# Patient Record
Sex: FEMALE | HISPANIC OR LATINO | Employment: UNEMPLOYED | URBAN - METROPOLITAN AREA
[De-identification: names, ages, dates, MRNs, and addresses within clinical notes are randomized per-mention and may not be internally consistent; named-entity substitution may affect disease eponyms.]

---

## 2023-10-26 ENCOUNTER — HOSPITAL ENCOUNTER (EMERGENCY)
Facility: CLINIC | Age: 23
Discharge: HOME | End: 2023-10-26
Attending: EMERGENCY MEDICINE | Admitting: EMERGENCY MEDICINE

## 2023-10-26 VITALS
DIASTOLIC BLOOD PRESSURE: 71 MMHG | OXYGEN SATURATION: 99 % | TEMPERATURE: 97.9 F | SYSTOLIC BLOOD PRESSURE: 117 MMHG | RESPIRATION RATE: 20 BRPM | HEART RATE: 103 BPM

## 2023-10-26 DIAGNOSIS — T80.29XA INFECTION OF INJECTION SITE, INITIAL ENCOUNTER: ICD-10-CM

## 2023-10-26 PROCEDURE — 99283 EMERGENCY DEPT VISIT LOW MDM: CPT | Performed by: EMERGENCY MEDICINE

## 2023-10-26 RX ORDER — DOXYCYCLINE 100 MG/1
100 CAPSULE ORAL 2 TIMES DAILY
Qty: 14 CAPSULE | Refills: 0 | Status: SHIPPED | OUTPATIENT
Start: 2023-10-26 | End: 2023-11-02

## 2023-10-26 NOTE — ED TRIAGE NOTES
Pt arrives ambulatory to triage c/o R arm numbness and tingling.  Hx of meth use.  Uses the same vein on the R arm and shows signs of a possible infection.       Triage Assessment (Adult)       Row Name 10/26/23 0943          Triage Assessment    Airway WDL WDL        Respiratory WDL    Respiratory WDL WDL        Skin Circulation/Temperature WDL    Skin Circulation/Temperature WDL WDL        Cardiac WDL    Cardiac WDL WDL        Peripheral/Neurovascular WDL    Peripheral Neurovascular WDL WDL        Cognitive/Neuro/Behavioral WDL    Cognitive/Neuro/Behavioral WDL WDL

## 2023-10-26 NOTE — PROGRESS NOTES
Brief Social Work Progress Note    Information Obtained: Writer received page from Pharmacy RE: Pt being unable to pay for her discharge medications.     Spoke with pharmacist/pharm tech. Pt will be discharged on doxycycline; cost to fill is $20.24. Chart reviewed. Pt has an ongoing IV meth use and reports she does not plan to stop at this time. Pt's infection that the medications will be treating are as a result of pt's IV drug use per MD. Per MD, infection has been caught early, and before it has gotten worse, will agree to provide one time coverage for discharge Abx at this time.     Follow-Up Plan: None         ________________    RUTH Camejo, [facility]  ED/Observation   [facility]  Phone: [phone]  Pager: [phone]  Fax: [phone]    On-call pager, [phone], 4:00pm to midnight

## 2023-10-26 NOTE — DISCHARGE INSTRUCTIONS
Thank you for coming to the [facility] Emergency Department.     Please try to stop using IV drugs. Do not continue to inject into the right arm.   Apply warm packs to the sore area and take all the antibiotics until they are gone.     Return to the ER immediately if you get:  Arm redness or worsening swelling  Fever >100.4F    For assistance with getting clean or stopping IV use, please follow up in the:    [facility]  [facility] [address], [address]   [place], [place], [address]  Phone: [phone]  Fax: [phone]    Open Monday-Friday  Closed over lunch hour  Walk in hours: 9am-11:30am and 12:30-3pm    *For [facility] providers, if you'd like to have Recovery Clinic staff reach out to a patient, enter ambulatory order Adult Mental Health  Referral #[id number] for Addiction Medicine or [facility] Mental Health Referral #[phone] for Addiction Medicine, as appropriate.

## 2023-10-26 NOTE — ED PROVIDER NOTES
ED Provider Note  [facility]      History     Chief Complaint   Patient presents with    Arm Pain     HPI  Myra Tian is a 23-year-old female who she has a history of IV meth abuse. She regularly injects in to the right antecubital fossa and continues to use the site despite pain. Now has pain in a band on the radial side of the right forearm. No skin rash or erythema. She does not have fevers or chills. She does feel like her arm intermittently tingles but she has sensation in the hand and normal arm motor function.  She is not interested in stopping use.    Past Medical History  No past medical history on file.  No past surgical history on file.  doxycycline hyclate (VIBRAMYCIN) 100 MG capsule      No Known Allergies  Family History  No family history on file.  Social History          A medically appropriate review of systems was performed with pertinent positives and negatives noted in the HPI, and all other systems negative.    Physical Exam   BP: 117/71  Pulse: 103  Temp: 97.9  F (36.6  C)  Resp: 20  SpO2: 99 %  Physical Exam  Gen:A&Ox3, no acute distress  CV:RRR without murmurs  PULM:Clear to auscultation bilaterally  UE: + radial pulse and normal hand perfusion. Capillary refill 1 second. Right antecubital fossa with injection marks. Has firm, non-fluctuant area on the medial side of the fossa.   LE:no traumatic injuries, skin normal  Neuro:CN II-XII intact, strength 5/5 throughout the right arm, sensation intact throughout the right arm  Skin: no rashes or ecchymoses      ED Course, Procedures, & Data      Procedures         No results found for any visits on 10/26/23.  Medications - No data to display  Labs Ordered and Resulted from Time of ED Arrival to Time of ED Departure - No data to display  No orders to display          Critical care was not performed.     Medical Decision Making  The patient's presentation was of moderate complexity (an acute complicated  injury).    The patient's evaluation involved:  history and exam without other MDM data elements    The patient's management necessitated moderate risk (prescription drug management including medications given in the ED).    Assessment & Plan     23-year-old female presenting with right arm pain in the setting of IV drug injection.  On her physical exam she has an area of scarred nodularity that appears to be a healed prior skin soft tissue infection. She does not have current fluctuance, erythema or draining wounds.  Her hand is neurovascularly intact.  I do not have suspicion today for acute necrotizing fasciitis or severe significant neurovascular injury.  We will have her apply warm packs to the sore area. I did start her on some antibiotics for the possibility of a deep abscess that I cannot appreciate right now. Instructed to return if a fluctuant abscess develops. I referred her to follow-up in the recovery clinic for assistance if she changes her mind about interest in getting clean. Reviewed return instructions in the event of signs of developing skin soft tissue infection that is more significant.  Discharged.    I have reviewed the nursing notes. I have reviewed the findings, diagnosis, plan and need for follow up with the patient.    New Prescriptions    DOXYCYCLINE HYCLATE (VIBRAMYCIN) 100 MG CAPSULE    Take 1 capsule (100 mg) by mouth 2 times daily for 7 days       Final diagnoses:   Infection of injection site, initial encounter     This part of the medical record was transcribed by Faby Wolfe, Medical Scribe, from a dictation done by Mely Holloway MD.     Mely Holloway MD  [facility] EMERGENCY DEPARTMENT  10/26/2023     Mely Holloway MD  10/27/23 1000

## 2024-05-02 ENCOUNTER — APPOINTMENT (OUTPATIENT)
Dept: GENERAL RADIOLOGY | Facility: CLINIC | Age: 24
End: 2024-05-02
Attending: EMERGENCY MEDICINE
Payer: MEDICAID

## 2024-05-02 ENCOUNTER — HOSPITAL ENCOUNTER (OUTPATIENT)
Facility: CLINIC | Age: 24
Setting detail: OBSERVATION
Discharge: HOME | End: 2024-05-03
Attending: EMERGENCY MEDICINE | Admitting: EMERGENCY MEDICINE
Payer: MEDICAID

## 2024-05-02 ENCOUNTER — APPOINTMENT (OUTPATIENT)
Dept: ULTRASOUND IMAGING | Facility: CLINIC | Age: 24
End: 2024-05-02
Attending: EMERGENCY MEDICINE
Payer: MEDICAID

## 2024-05-02 DIAGNOSIS — L03.113 CELLULITIS OF RIGHT HAND: ICD-10-CM

## 2024-05-02 DIAGNOSIS — F19.10 POLYSUBSTANCE ABUSE (H): ICD-10-CM

## 2024-05-02 DIAGNOSIS — L02.91 PHLEGMON: ICD-10-CM

## 2024-05-02 LAB
ALBUMIN SERPL BCG-MCNC: 4.2 G/DL (ref 3.5–5.2)
ALP SERPL-CCNC: 79 U/L (ref 40–150)
ALT SERPL W P-5'-P-CCNC: 25 U/L (ref 0–50)
ANION GAP SERPL CALCULATED.3IONS-SCNC: 8 MMOL/L (ref 7–15)
AST SERPL W P-5'-P-CCNC: 26 U/L (ref 0–45)
BASOPHILS # BLD AUTO: 0.1 10E3/UL (ref 0–0.2)
BASOPHILS NFR BLD AUTO: 1 %
BILIRUB SERPL-MCNC: 0.3 MG/DL
BUN SERPL-MCNC: 11 MG/DL (ref 6–20)
CALCIUM SERPL-MCNC: 9.1 MG/DL (ref 8.6–10)
CHLORIDE SERPL-SCNC: 100 MMOL/L (ref 98–107)
CREAT SERPL-MCNC: 0.75 MG/DL (ref 0.51–0.95)
CRP SERPL-MCNC: 15.32 MG/L
DEPRECATED HCO3 PLAS-SCNC: 29 MMOL/L (ref 22–29)
EGFRCR SERPLBLD CKD-EPI 2021: >90 ML/MIN/1.73M2
EOSINOPHIL # BLD AUTO: 0.1 10E3/UL (ref 0–0.7)
EOSINOPHIL NFR BLD AUTO: 2 %
ERYTHROCYTE [DISTWIDTH] IN BLOOD BY AUTOMATED COUNT: 12.2 % (ref 10–15)
ERYTHROCYTE [SEDIMENTATION RATE] IN BLOOD BY WESTERGREN METHOD: 30 MM/HR (ref 0–20)
GLUCOSE SERPL-MCNC: 99 MG/DL (ref 70–99)
HCG SERPL QL: NEGATIVE
HCT VFR BLD AUTO: 36.2 % (ref 35–47)
HGB BLD-MCNC: 11.6 G/DL (ref 11.7–15.7)
IMM GRANULOCYTES # BLD: 0 10E3/UL
IMM GRANULOCYTES NFR BLD: 0 %
LYMPHOCYTES # BLD AUTO: 3.1 10E3/UL (ref 0.8–5.3)
LYMPHOCYTES NFR BLD AUTO: 34 %
MCH RBC QN AUTO: 28.8 PG (ref 26.5–33)
MCHC RBC AUTO-ENTMCNC: 32 G/DL (ref 31.5–36.5)
MCV RBC AUTO: 90 FL (ref 78–100)
MONOCYTES # BLD AUTO: 0.5 10E3/UL (ref 0–1.3)
MONOCYTES NFR BLD AUTO: 6 %
NEUTROPHILS # BLD AUTO: 5.4 10E3/UL (ref 1.6–8.3)
NEUTROPHILS NFR BLD AUTO: 57 %
NRBC # BLD AUTO: 0 10E3/UL
NRBC BLD AUTO-RTO: 0 /100
PLATELET # BLD AUTO: 276 10E3/UL (ref 150–450)
POTASSIUM SERPL-SCNC: 3.9 MMOL/L (ref 3.4–5.3)
PROT SERPL-MCNC: 7.2 G/DL (ref 6.4–8.3)
RBC # BLD AUTO: 4.03 10E6/UL (ref 3.8–5.2)
SODIUM SERPL-SCNC: 137 MMOL/L (ref 135–145)
WBC # BLD AUTO: 9.2 10E3/UL (ref 4–11)

## 2024-05-02 PROCEDURE — 85652 RBC SED RATE AUTOMATED: CPT | Performed by: EMERGENCY MEDICINE

## 2024-05-02 PROCEDURE — 36591 DRAW BLOOD OFF VENOUS DEVICE: CPT

## 2024-05-02 PROCEDURE — 250N000013 HC RX MED GY IP 250 OP 250 PS 637: Performed by: EMERGENCY MEDICINE

## 2024-05-02 PROCEDURE — 82040 ASSAY OF SERUM ALBUMIN: CPT | Performed by: EMERGENCY MEDICINE

## 2024-05-02 PROCEDURE — G0378 HOSPITAL OBSERVATION PER HR: HCPCS

## 2024-05-02 PROCEDURE — 86140 C-REACTIVE PROTEIN: CPT | Performed by: EMERGENCY MEDICINE

## 2024-05-02 PROCEDURE — 99285 EMERGENCY DEPT VISIT HI MDM: CPT | Performed by: EMERGENCY MEDICINE

## 2024-05-02 PROCEDURE — 73130 X-RAY EXAM OF HAND: CPT | Mod: RT

## 2024-05-02 PROCEDURE — 85004 AUTOMATED DIFF WBC COUNT: CPT | Performed by: EMERGENCY MEDICINE

## 2024-05-02 PROCEDURE — 84703 CHORIONIC GONADOTROPIN ASSAY: CPT | Performed by: EMERGENCY MEDICINE

## 2024-05-02 PROCEDURE — 999N000127 HC STATISTIC PERIPHERAL IV START W US GUIDANCE

## 2024-05-02 PROCEDURE — 76882 US LMTD JT/FCL EVL NVASC XTR: CPT | Mod: RT

## 2024-05-02 PROCEDURE — 99223 1ST HOSP IP/OBS HIGH 75: CPT | Performed by: EMERGENCY MEDICINE

## 2024-05-02 RX ORDER — DOXYCYCLINE 100 MG/1
100 CAPSULE ORAL 2 TIMES DAILY
Qty: 28 CAPSULE | Refills: 0 | Status: SHIPPED | OUTPATIENT
Start: 2024-05-02 | End: 2024-05-16

## 2024-05-02 RX ORDER — DOXYCYCLINE 100 MG/1
100 CAPSULE ORAL ONCE
Status: COMPLETED | OUTPATIENT
Start: 2024-05-02 | End: 2024-05-02

## 2024-05-02 RX ORDER — DOXYCYCLINE 100 MG/1
100 CAPSULE ORAL EVERY 12 HOURS SCHEDULED
Status: DISCONTINUED | OUTPATIENT
Start: 2024-05-02 | End: 2024-05-03 | Stop reason: HOSPADM

## 2024-05-02 RX ADMIN — NICOTINE POLACRILEX 4 MG: 4 GUM, CHEWING BUCCAL at 22:29

## 2024-05-02 RX ADMIN — DOXYCYCLINE HYCLATE 100 MG: 100 CAPSULE ORAL at 11:42

## 2024-05-02 RX ADMIN — DOXYCYCLINE HYCLATE 100 MG: 100 CAPSULE ORAL at 21:21

## 2024-05-02 ASSESSMENT — ACTIVITIES OF DAILY LIVING (ADL)
ADLS_ACUITY_SCORE: 35

## 2024-05-02 ASSESSMENT — COLUMBIA-SUICIDE SEVERITY RATING SCALE - C-SSRS
5. HAVE YOU STARTED TO WORK OUT OR WORKED OUT THE DETAILS OF HOW TO KILL YOURSELF? DO YOU INTEND TO CARRY OUT THIS PLAN?: NO
1. IN THE PAST MONTH, HAVE YOU WISHED YOU WERE DEAD OR WISHED YOU COULD GO TO SLEEP AND NOT WAKE UP?: YES
4. HAVE YOU HAD THESE THOUGHTS AND HAD SOME INTENTION OF ACTING ON THEM?: NO
3. HAVE YOU BEEN THINKING ABOUT HOW YOU MIGHT KILL YOURSELF?: NO
6. HAVE YOU EVER DONE ANYTHING, STARTED TO DO ANYTHING, OR PREPARED TO DO ANYTHING TO END YOUR LIFE?: YES
2. HAVE YOU ACTUALLY HAD ANY THOUGHTS OF KILLING YOURSELF IN THE PAST MONTH?: YES

## 2024-05-02 NOTE — CONSULTS
.[facility]  ORTHOPAEDIC SURGERY CONSULT - HISTORY AND PHYSICAL    DATE OF CONSULT: 5/2/2024 8:46 AM    REQUESTING PROVIDER: Ori Moore MD, MD - [facility] Staff.    CC: R hand swelling and erythema    DATE OF INJURY: 5/2/2024    ASSESSMENT:     Myra Tian is a 23 year old Female w/ PMHx IVDU (fentanyl and meth) with what appears to be soft tissue/cellulitic infection of the right first dorsal webspace.  After carefully reviewing results of ultrasonography, unable to appreciate any forming abscess that necessitates any procedural drainage. Furthermore, patient has largely reassuring physical exam findings at this time, with overall preserved hand function and ability. At this time, patient can be treated with antibiotics alone.    PLAN:   - No plan for admission at this time.  - Plan for OR: No plan for OR at this time.  - Anticoagulation/DVT Prophylaxis: per ED, no recommendations per ortho.  - Antibiotics/Tetanus: per ED to optimize antibiotics for patient.  - X-rays/Imaging: no further imaging per ortho  - Activity: no restrictions  - Weight bearing: WBAT RUE  - Pain control: per ED  - Diet: no restrictions per ortho  - Follow-up: 2 week follow up with hand specialist, patient can cancel if need be  - Disposition: ok to discharge per ortho team    Assessment and Plan discussed with:  - Dr. Price, PGY-4.  - Dr. Ruff, Staff.    Torsten Glasgow MD  Resident Physician  Orthopedic Surgery    HISTORY OF PRESENT ILLNESS:     The orthopaedic surgery service was consulted by Ori Espinoza MD for evaluation and treatment recommendations of above chief complaint.    Myra Tian is a 23 year old female with pmhx IVDU (fentanyl and meth) who presents with swelling and erythema in the R 1st dorsal web space. The swelling and redness has been ongoing for 3 days now. The patient attempted to inject a fentanyl, meth, and sink water mixture into that area 3 days ago. She believes  that the vein was missed and the contents were injected into the surrounding area. R 1st dorsal web space is her usual injection site. Denies numbness, tingling, or weakness to the affected extremities. Reports pain in the R 1st dorsal web space with movement or when pressing.    She reports that she has had instances like this in the past, treated with antibiotics by the ED.     The patient reports that she last used fentanyl and meth about 1 hour ago. She used a different injection site.    Denies fevers, chills, nausea, vomiting, diarrhea, constipation, chest pain, shortness of breath.    PAST MEDICAL HISTORY: Denies.    PAST SURGICAL HISTORY: Denies.     MEDICATIONS: Denies the use of any prescribed medications.    ALLERGIES: Denies any allergies.    SOCIAL HISTORY:   Social History     Socioeconomic History    Marital status: Single     Spouse name: Not on file    Number of children: Not on file    Years of education: Not on file    Highest education level: Not on file   Occupational History    Not on file   Tobacco Use    Smoking status: Never     Passive exposure: Never    Smokeless tobacco: Never   Substance and Sexual Activity    Alcohol use: Not Currently    Drug use: Yes     Types: Methamphetamines, Fentanyl, Benzodiazepines    Sexual activity: Not Currently   Other Topics Concern    Not on file   Social History Narrative    Not on file     Social Determinants of Health     Financial Resource Strain: Not on File (8/16/2023)    Received from [facility]     Financial Resource Strain     Financial Resource Strain: 0   Food Insecurity: Not on File (8/16/2023)    Received from [facility]     Food Insecurity     Food: 0   Transportation Needs: Not on File (8/16/2023)    Received from [facility]     Transportation Needs     Transportation: 0   Physical Activity: Not on File (8/16/2023)    Received from [facility]     Physical Activity     Physical Activity: 0   Stress: Not on File (8/16/2023)     Received from [facility]     Stress     Stress: 0   Social Connections: Not on File (2023)    Received from [facility]     Social Connections     Social Connections and Isolation: 0   Interpersonal Safety: Not on file   Housing Stability: Not on File (2023)    Received from [facility]     Housing Stability     Housin     Tobacco: reports smoking 1 PPD  Alcohol: denies the use of alcohol  Illicit Drugs: IV drug use combinations of meth and fentanyl    FAMILY HISTORY:  Patient denies known family history of bleeding, clotting, or anesthesia related complications.     REVIEW OF SYSTEMS:   Pertinent outlined in the HPI of this note.    PHYSICAL EXAM:   Vitals:    24 0736 24 1009   BP: 107/69 107/67   BP Location:  Right arm   Patient Position:  Supine   Pulse: 89 92   Resp: 16 16   Temp: 97.4  F (36.3  C) 97.7  F (36.5  C)   TempSrc: Oral Oral   SpO2: 100% 100%   Weight: 77.1 kg (170 lb)    Height: 1.524 m (5')      General: Awake, alert, appropriate, following commands, NAD.  Lungs: Breathing comfortably and nonlabored, no wheezes or stridor noted.  Heart/Cardiovascular: Regular pulse, no peripheral cyanosis.    Right Upper Extremity:   Able to appreciate erythema and edema across 1st dorsal web space; this does not appear to extend around base of the thumb and does not appear to extend to palmar side.   Tenderness to palpation overlying the erythema and edema.   Minimal tenderness with passive ROM pendulums of the thumb.   Slight tenderness with extremes of passive flexion and extension of pointer finger.  Patient able to make a complete fist, patient able to completely spread digits apart.  No significant tenderness to palpation over clavicle, AC joint, shoulder, arm, elbow, forearm, wrist.   Normal ROM shoulder, elbow, wrist without pain.   SILT ax/m/r/u nerve distributions.   Radial pulse palpable, 2+. Hand appears warm and well perfused.        LABS:  Hemoglobin   Date Value Ref  "Range Status   05/02/2024 11.6 (L) 11.7 - 15.7 g/dL Final     WBC Count   Date Value Ref Range Status   05/02/2024 9.2 4.0 - 11.0 10e3/uL Final     Platelet Count   Date Value Ref Range Status   05/02/2024 276 150 - 450 10e3/uL Final     No results found for: \"INR\"  Creatinine   Date Value Ref Range Status   05/02/2024 0.75 0.51 - 0.95 mg/dL Final     Glucose   Date Value Ref Range Status   05/02/2024 99 70 - 99 mg/dL Final     No results found for: \"CRP\"  Erythrocyte Sedimentation Rate   Date Value Ref Range Status   05/02/2024 30 (H) 0 - 20 mm/hr Final     IMAGING:    Personally reviewed XR of the R hand from 5/2. Unable to appreciate any acute osseous abnormalities. Possible increase in signal intensity across first web space. Possible soft tissue swelling in dorsal hand. Unable to appreciate any retained foreign body on films.    Ultrasonography results of the right dorsal first webspace does not reveal any easily appreciated abscess.  Well confined interstitial fluid appreciated.  "

## 2024-05-02 NOTE — CONSULTS
"Consult received for Vascular access care.  See LDA for details.  For additional needs place \"Nursing to Consult for Vascular Access\" [id number] order in EPIC.   "

## 2024-05-02 NOTE — ED PROVIDER NOTES
"ED Provider Note  [facility]      History     Chief Complaint   Patient presents with    Drug / Alcohol Assessment     Fentanyl, smoke and IV meth and percs, last use PTA, R hand swelling due to IV injection with \"speed ball\"    Suicidal     Thoughts only       Drug / Alcohol Assessment      Myra Tian is a 23 year old female who has history of polysubstance abuse mostly with fentanyl and methamphetamine, presents to the emergency department the chief complaint of recent drug use, desire to detox from methamphetamine and opiates, as well as swelling of the right hand where she injected IV drugs 3 days ago.  Patient is right-handed.  Patient reports that she injected street drugs that she thought was a combination of methamphetamine and fentanyl into the dorsal aspect of right hand 3 days ago.  She has had persistent, progressive, expanding erythema and swelling of the right hand since that time with full range of motion but starting to feel limited with extension and flexion secondary to swelling.  She denies fevers or chills.  She has no restrictions with range of motion of her wrist.  She has had cellulitis related to IV drug injections in the past that have improved with oral doxycycline, and is not on any antibiotics at this time.  Of note, patient initially noted that she had some suicidal thoughts in triage, however on my assessment she denies that she is actively suicidal at this time and has no plan, feels safe in the room on her own without additional concern.  She denies any alcohol intake.  She has not used Suboxone or any treatment program for opiate withdrawal in the past but she is interested in pursuing options that might be available.  She last used drugs earlier this morning per her report.    Past Medical History  History reviewed. No pertinent past medical history.  History reviewed. No pertinent surgical history.  doxycycline hyclate (VIBRAMYCIN) 100 MG " capsule      No Known Allergies  Family History  History reviewed. No pertinent family history.  Social History   Social History     Tobacco Use    Smoking status: Never     Passive exposure: Never    Smokeless tobacco: Never   Substance Use Topics    Alcohol use: Not Currently    Drug use: Yes     Types: Methamphetamines, Fentanyl, Benzodiazepines      Past medical history, past surgical history, medications, allergies, family history, and social history were reviewed with the patient. No additional pertinent items.      A medically appropriate review of systems was performed with pertinent positives and negatives noted in the HPI, and all other systems negative.    Physical Exam   BP: 107/69  Pulse: 89  Temp: 97.4  F (36.3  C)  Resp: 16  Height: 152.4 cm (5')  Weight: 77.1 kg (170 lb)  SpO2: 100 %  Physical Exam  Vitals and nursing note reviewed.   Constitutional:       General: She is not in acute distress.     Appearance: Normal appearance. She is not diaphoretic.   HENT:      Head: Normocephalic and atraumatic.      Mouth/Throat:      Mouth: Mucous membranes are moist.      Pharynx: Oropharynx is clear.   Eyes:      General: No scleral icterus.     Conjunctiva/sclera: Conjunctivae normal.   Cardiovascular:      Rate and Rhythm: Normal rate and regular rhythm.      Heart sounds: Normal heart sounds.   Pulmonary:      Effort: No respiratory distress.      Breath sounds: Normal breath sounds.   Abdominal:      General: Abdomen is flat.   Musculoskeletal:         General: Swelling and deformity present.      Cervical back: Neck supple.      Comments: Swelling to dorsal side of right hand with overlying skin erythema and warmth   Skin:     General: Skin is warm.      Findings: Erythema present.   Neurological:      Mental Status: She is alert and oriented to person, place, and time.      Sensory: No sensory deficit.      Motor: No weakness.      Comments: Sleepy but arousable, ambulatory without truncal ataxia,               ED Course, Procedures, & Data      Procedures       A consult was attained from the orthopedic surgery service. The case was discussed with on call resident physician from that service. The consulting service's recommendations were provided promptly.       -----  Observation Addendum  With this Addendum, this ED Provider Note may also serve as an Observation H&P    Observation Initiation Date: May 2, 2024    Patient arriving with altered mental status with reason to suspect alcohol or other drug intoxication as etiology. Nursing notes reviewed. Exam without findings suggestive of trauma, non-focal. Glucose  normal .     AMS is likely due to intoxication delirium but cannot immediately rule out other dangerous etiologies of AMS. Plan close clinical monitoring of the patient and her mental status for clearing of intoxicating substance. With approriate clearing, the patient would likely be able to be discharged. If not appropriately clearing, plan broadening of work-up, potentially including CT head and serum labs.     During the initial care period, the patient did not require medications for agitation, and did not require restraints/seclusion for patient and/or provider safety.     The patient's outpatient medications were not reconciled due to altered mental status.   -----        Results for orders placed or performed during the hospital encounter of 05/02/24   XR Hand Right G/E 3 Views     Status: None    Narrative    HAND THREE  VIEWS RIGHT 5/2/2024 9:21 AM     HISTORY: swelling, infection at  injection site, eval for retained  foreign body  COMPARISON: None.      Impression    IMPRESSION: Mild dorsal hand soft tissue swelling. No definite  radiopaque foreign body is identified. No acute fracture or  malalignment. There is normal joint spacing.    BASIM MODI MD         SYSTEM ID:  [id number]   US Upper Extremity Non Vascular Right     Status: None    Narrative    ULTRASOUND RIGHT UPPER EXTREMITY  NONVASCULAR   5/2/2024 9:15 AM     HISTORY:  Right hand soft tissue swelling, evaluate for fluid  collection.    COMPARISON: None.      Impression    IMPRESSION:  Sonographic evaluation of the hand in the region of  clinical concern demonstrates diffuse soft tissue swelling as well as  hyperemia in the area of concern, consistent with inflammation. In the  center of this area, there is a tiny poorly marginated amount of  interstitial fluid extending over an area which is up to 3 mm in depth  x 20 mm transverse x 23 mm in length. No fluid collection with a thick  enhancing rim to suggest a drainable abscess.    TODD BIANCHI MD         SYSTEM ID:  [id number]   Comprehensive metabolic panel     Status: Normal   Result Value Ref Range    Sodium 137 135 - 145 mmol/L    Potassium 3.9 3.4 - 5.3 mmol/L    Carbon Dioxide (CO2) 29 22 - 29 mmol/L    Anion Gap 8 7 - 15 mmol/L    Urea Nitrogen 11.0 6.0 - 20.0 mg/dL    Creatinine 0.75 0.51 - 0.95 mg/dL    GFR Estimate >90 >60 mL/min/1.73m2    Calcium 9.1 8.6 - 10.0 mg/dL    Chloride 100 98 - 107 mmol/L    Glucose 99 70 - 99 mg/dL    Alkaline Phosphatase 79 40 - 150 U/L    AST 26 0 - 45 U/L    ALT 25 0 - 50 U/L    Protein Total 7.2 6.4 - 8.3 g/dL    Albumin 4.2 3.5 - 5.2 g/dL    Bilirubin Total 0.3 <=1.2 mg/dL   HCG qualitative pregnancy (blood)     Status: Normal   Result Value Ref Range    hCG Serum Qualitative Negative Negative   Erythrocyte sedimentation rate auto     Status: Abnormal   Result Value Ref Range    Erythrocyte Sedimentation Rate 30 (H) 0 - 20 mm/hr   CRP inflammation     Status: Abnormal   Result Value Ref Range    CRP Inflammation 15.32 (H) <5.00 mg/L   CBC with platelets and differential     Status: Abnormal   Result Value Ref Range    WBC Count 9.2 4.0 - 11.0 10e3/uL    RBC Count 4.03 3.80 - 5.20 10e6/uL    Hemoglobin 11.6 (L) 11.7 - 15.7 g/dL    Hematocrit 36.2 35.0 - 47.0 %    MCV 90 78 - 100 fL    MCH 28.8 26.5 - 33.0 pg    MCHC 32.0 31.5 - 36.5 g/dL     RDW 12.2 10.0 - 15.0 %    Platelet Count 276 150 - 450 10e3/uL    % Neutrophils 57 %    % Lymphocytes 34 %    % Monocytes 6 %    % Eosinophils 2 %    % Basophils 1 %    % Immature Granulocytes 0 %    NRBCs per 100 WBC 0 <1 /100    Absolute Neutrophils 5.4 1.6 - 8.3 10e3/uL    Absolute Lymphocytes 3.1 0.8 - 5.3 10e3/uL    Absolute Monocytes 0.5 0.0 - 1.3 10e3/uL    Absolute Eosinophils 0.1 0.0 - 0.7 10e3/uL    Absolute Basophils 0.1 0.0 - 0.2 10e3/uL    Absolute Immature Granulocytes 0.0 <=0.4 10e3/uL    Absolute NRBCs 0.0 10e3/uL   CBC with Platelets & Differential     Status: Abnormal    Narrative    The following orders were created for panel order CBC with Platelets & Differential.  Procedure                               Abnormality         Status                     ---------                               -----------         ------                     CBC with platelets and d...[[id number]]  Abnormal            Final result                 Please view results for these tests on the individual orders.     Medications   pharmacy alert - intermittent dosing (has no administration in time range)   doxycycline hyclate (VIBRAMYCIN) capsule 100 mg (100 mg Oral $Given 5/2/24 1142)     Labs Ordered and Resulted from Time of ED Arrival to Time of ED Departure   ERYTHROCYTE SEDIMENTATION RATE AUTO - Abnormal       Result Value    Erythrocyte Sedimentation Rate 30 (*)    CRP INFLAMMATION - Abnormal    CRP Inflammation 15.32 (*)    CBC WITH PLATELETS AND DIFFERENTIAL - Abnormal    WBC Count 9.2      RBC Count 4.03      Hemoglobin 11.6 (*)     Hematocrit 36.2      MCV 90      MCH 28.8      MCHC 32.0      RDW 12.2      Platelet Count 276      % Neutrophils 57      % Lymphocytes 34      % Monocytes 6      % Eosinophils 2      % Basophils 1      % Immature Granulocytes 0      NRBCs per 100 WBC 0      Absolute Neutrophils 5.4      Absolute Lymphocytes 3.1      Absolute Monocytes 0.5      Absolute Eosinophils 0.1      Absolute  Basophils 0.1      Absolute Immature Granulocytes 0.0      Absolute NRBCs 0.0     COMPREHENSIVE METABOLIC PANEL - Normal    Sodium 137      Potassium 3.9      Carbon Dioxide (CO2) 29      Anion Gap 8      Urea Nitrogen 11.0      Creatinine 0.75      GFR Estimate >90      Calcium 9.1      Chloride 100      Glucose 99      Alkaline Phosphatase 79      AST 26      ALT 25      Protein Total 7.2      Albumin 4.2      Bilirubin Total 0.3     HCG QUALITATIVE PREGNANCY - Normal    hCG Serum Qualitative Negative       XR Hand Right G/E 3 Views   Final Result   IMPRESSION: Mild dorsal hand soft tissue swelling. No definite   radiopaque foreign body is identified. No acute fracture or   malalignment. There is normal joint spacing.      BASIM MODI MD            SYSTEM ID:  [id number]      US Upper Extremity Non Vascular Right   Final Result   IMPRESSION:  Sonographic evaluation of the hand in the region of   clinical concern demonstrates diffuse soft tissue swelling as well as   hyperemia in the area of concern, consistent with inflammation. In the   center of this area, there is a tiny poorly marginated amount of   interstitial fluid extending over an area which is up to 3 mm in depth   x 20 mm transverse x 23 mm in length. No fluid collection with a thick   enhancing rim to suggest a drainable abscess.      TODD BIANCHI MD            SYSTEM ID:  [id number]             Critical care was not performed.     Medical Decision Making  The patient's presentation was of high complexity (an acute health issue posing potential threat to life or bodily function).    The patient's evaluation involved:  review of external note(s) from 1 sources (see separate area of note for details)  review of 3+ test result(s) ordered prior to this encounter (see separate area of note for details)  ordering and/or review of 3+ test(s) in this encounter (see separate area of note for details)  discussion of management or test interpretation with  another health professional (see separate area of note for details)    The patient's management necessitated moderate risk (prescription drug management including medications given in the ED) and high risk (a decision regarding hospitalization).    Assessment & Plan    Myra Tian is a 23 year old female who has history of polysubstance abuse mostly with fentanyl and methamphetamine, presents to the emergency department the chief complaint of recent drug use, desire to detox from methamphetamine and opiates, as well as swelling of the right hand where she injected IV drugs 3 days ago.  During initial assessment, patient is awake, ambulatory and participatory, has vital signs within normal limits.  She became sleepy over the course of the day and was arousable to voice but consultants had difficulty speaking with her as the patient became more intoxicated from her drug like state.  Lab work was obtained and inflammatory markers were elevated.  Patient's hand is erythematous and swollen, concerning for possible deeper space infection.  She was started on doxycycline for management of cellulitis component.  X-ray was obtained to evaluate for retained foreign body such as a needle which was negative on my read but did show soft tissue swelling.  Orthopedic surgery hand specialist was consulted for evaluation and recommended ultrasound which was obtained and showed underlying fluid without organized abscess at this time.  Orthopedic team evaluated patient and recommended antibiotics and outpatient follow up as needed with hand team; referral placed. Patient too sleepy to arrange detox disposition at this time and admitted to observation for further monitoring and care.     I have reviewed the nursing notes. I have reviewed the findings, diagnosis, plan and need for follow up with the patient.    New Prescriptions    DOXYCYCLINE HYCLATE (VIBRAMYCIN) 100 MG CAPSULE    Take 1 capsule (100 mg) by mouth 2 times daily for  14 days       Final diagnoses:   Cellulitis of right hand   Phlegmon   Polysubstance abuse (H)       Ori Moore MD  [facility] EMERGENCY DEPARTMENT  5/2/2024     Ori Moore MD  05/02/24 1448

## 2024-05-02 NOTE — PROGRESS NOTES
Attempted to meet with patient to discuss possible treatment options and to possibly complete an assessment.   Patient appeared very asleep.  Attempted to awaken her by verbal stimuli and when this staff was close to shouting her name patient barely repositioned and kept sleeping.  Will attempt again tomorrow if patient remains in the hospital.      If pt discharges prior to consult, they can call [facility] at [phone] to schedule an OP CHERI Assessment.    Joey Bond [facility] on 5/2/2024 at 2:11 PM

## 2024-05-02 NOTE — ED TRIAGE NOTES
"Seeking detox off of fentanyl, smoke and IV meth and percs, last use PTA, R hand swelling due to IV injection with \"speed ball\". Answer yes to feeling suicidal with thoughts only, no plan, states its very situational.     Triage Assessment (Adult)       Row Name 05/02/24 0732          Triage Assessment    Airway WDL WDL        Respiratory WDL    Respiratory WDL WDL        Skin Circulation/Temperature WDL    Skin Circulation/Temperature WDL X  swelling and redness to R hand        Cardiac WDL    Cardiac WDL WDL        Peripheral/Neurovascular WDL    Peripheral Neurovascular WDL WDL        Cognitive/Neuro/Behavioral WDL    Cognitive/Neuro/Behavioral WDL WDL                     "

## 2024-05-02 NOTE — DISCHARGE INSTRUCTIONS
Please make an appointment to follow up with [facility] (phone: [phone]) in 5-7 days.    Take antibiotics as prescribed. Take with food.    Return to the emergency department for worsening pain, redness, fever, swelling, or hand weakness.

## 2024-05-02 NOTE — ED NOTES
"First Step  Program - Initial SUN Consult Note:    Demographics:  Patient name Myra Tian   /Age 2000, 23 year old   Gender/Ethnicity female   The patient's reported race is: White/   Chief Complaint Drug / Alcohol Assessment and Suicidal     Language of Care English    No     Writer was unit's assigned Substance Use Navigator (SUN) for the shift and was notified by [facility] Consult Order at  08:50 AM that Myra Tian presented seeking Mx for addiction treatment in the context of chronic opioid use. Pt approached in room 9 for SUN consult. Pt endorses using Fentanyl, unknown exact amount, but \"a lot\" daily. Pt reports primarily smoking fentanyl. Most recent use identified as approximately  07:00 AM on 2024 (today). Pt does not report withdrawal symptoms, but does state she is very tired and is having some intermittent nausea.     ED provider and primary RN notified of SUN consult and made aware of Pt's current condition/symptoms.    Other information:  Pt reports other substance use notable for: Methamphetamine (\"Meth\"), pt reports injecting and smoking.    Comfort items/interventions provided to Pt by SUN following initial consult: Warm blanket and Water/ice chips    Pt does not have a history of utilizing medication-assisted treatment (MAT) for their opioid use. Pt stated she has not wanted to get sober before today, so has not sought out medications or other recovery resources.     Pt wants to pursue treatment options following ED stay: Yes. Pt is interested in attending [facility].     Pt contact for follow-up: [phone]     "

## 2024-05-03 ENCOUNTER — OFFICE VISIT (OUTPATIENT)
Dept: BEHAVIORAL HEALTH | Facility: CLINIC | Age: 24
End: 2024-05-03
Payer: MEDICAID

## 2024-05-03 ENCOUNTER — TELEPHONE (OUTPATIENT)
Dept: BEHAVIORAL HEALTH | Facility: CLINIC | Age: 24
End: 2024-05-03

## 2024-05-03 ENCOUNTER — HOSPITAL ENCOUNTER (OUTPATIENT)
Dept: BEHAVIORAL HEALTH | Facility: CLINIC | Age: 24
Discharge: HOME | End: 2024-05-03
Attending: FAMILY MEDICINE
Payer: MEDICAID

## 2024-05-03 ENCOUNTER — APPOINTMENT (OUTPATIENT)
Dept: INTERPRETER SERVICES | Facility: CLINIC | Age: 24
End: 2024-05-03
Payer: MEDICAID

## 2024-05-03 VITALS — WEIGHT: 170 LBS | HEIGHT: 60 IN | BODY MASS INDEX: 33.38 KG/M2

## 2024-05-03 VITALS
TEMPERATURE: 97.4 F | SYSTOLIC BLOOD PRESSURE: 106 MMHG | DIASTOLIC BLOOD PRESSURE: 71 MMHG | OXYGEN SATURATION: 100 % | RESPIRATION RATE: 16 BRPM | HEIGHT: 60 IN | HEART RATE: 94 BPM | BODY MASS INDEX: 33.38 KG/M2 | WEIGHT: 170 LBS

## 2024-05-03 VITALS — DIASTOLIC BLOOD PRESSURE: 74 MMHG | SYSTOLIC BLOOD PRESSURE: 118 MMHG | HEART RATE: 85 BPM

## 2024-05-03 DIAGNOSIS — Z51.81 ENCOUNTER FOR MONITORING OPIOID MAINTENANCE THERAPY: ICD-10-CM

## 2024-05-03 DIAGNOSIS — F33.1 MAJOR DEPRESSIVE DISORDER, RECURRENT EPISODE, MODERATE (H): Primary | ICD-10-CM

## 2024-05-03 DIAGNOSIS — F15.20 SEVERE METHAMPHETAMINE USE DISORDER (H): ICD-10-CM

## 2024-05-03 DIAGNOSIS — F17.200 TOBACCO USE DISORDER: ICD-10-CM

## 2024-05-03 DIAGNOSIS — F32.A ANXIETY AND DEPRESSION: ICD-10-CM

## 2024-05-03 DIAGNOSIS — F41.9 ANXIETY AND DEPRESSION: ICD-10-CM

## 2024-05-03 DIAGNOSIS — F11.20 OPIOID USE DISORDER, SEVERE, DEPENDENCE (H): Primary | ICD-10-CM

## 2024-05-03 DIAGNOSIS — Z79.891 ENCOUNTER FOR MONITORING OPIOID MAINTENANCE THERAPY: ICD-10-CM

## 2024-05-03 DIAGNOSIS — F19.10 POLYDRUG ABUSE (H): ICD-10-CM

## 2024-05-03 DIAGNOSIS — F11.93 OPIOID WITHDRAWAL (H): ICD-10-CM

## 2024-05-03 LAB
AMPHETAMINE QUAL URINE POCT: ABNORMAL
BARBITURATE QUAL URINE POCT: NEGATIVE
BENZODIAZEPINE QUAL URINE POCT: NEGATIVE
BUPRENORPHINE QUAL URINE POCT: NEGATIVE
COCAINE QUAL URINE POCT: NEGATIVE
CREATININE QUAL URINE POCT: ABNORMAL
INTERNAL QC QUAL URINE POCT: ABNORMAL
MDMA QUAL URINE POCT: NEGATIVE
METHADONE QUAL URINE POCT: NEGATIVE
METHAMPHETAMINE QUAL URINE POCT: ABNORMAL
OPIATE QUAL URINE POCT: NEGATIVE
OXYCODONE QUAL URINE POCT: NEGATIVE
PH QUAL URINE POCT: ABNORMAL
PHENCYCLIDINE QUAL URINE POCT: NEGATIVE
POCT KIT EXPIRATION DATE: ABNORMAL
POCT KIT LOT NUMBER: ABNORMAL
SPECIFIC GRAVITY POCT: 1
TEMPERATURE URINE POCT: ABNORMAL
THC QUAL URINE POCT: NEGATIVE

## 2024-05-03 PROCEDURE — 99238 HOSP IP/OBS DSCHRG MGMT 30/<: CPT | Mod: FS | Performed by: EMERGENCY MEDICINE

## 2024-05-03 PROCEDURE — G2211 COMPLEX E/M VISIT ADD ON: HCPCS | Performed by: FAMILY MEDICINE

## 2024-05-03 PROCEDURE — 250N000013 HC RX MED GY IP 250 OP 250 PS 637: Performed by: EMERGENCY MEDICINE

## 2024-05-03 PROCEDURE — G0378 HOSPITAL OBSERVATION PER HR: HCPCS

## 2024-05-03 PROCEDURE — 99204 OFFICE O/P NEW MOD 45 MIN: CPT | Performed by: FAMILY MEDICINE

## 2024-05-03 PROCEDURE — 99207 PR NO CHARGE LOS: CPT | Performed by: SOCIAL WORKER

## 2024-05-03 PROCEDURE — 80305 DRUG TEST PRSMV DIR OPT OBS: CPT | Performed by: FAMILY MEDICINE

## 2024-05-03 RX ORDER — CLONIDINE HYDROCHLORIDE 0.1 MG/1
0.1 TABLET ORAL 2 TIMES DAILY PRN
Qty: 10 TABLET | Refills: 0 | Status: SHIPPED | OUTPATIENT
Start: 2024-05-03

## 2024-05-03 RX ORDER — LEVONORGESTREL 52 MG/1
1 INTRAUTERINE DEVICE INTRAUTERINE ONCE
COMMUNITY

## 2024-05-03 RX ORDER — ACETAMINOPHEN 500 MG
500-1000 TABLET ORAL EVERY 8 HOURS PRN
COMMUNITY

## 2024-05-03 RX ORDER — GABAPENTIN 300 MG/1
300 CAPSULE ORAL ONCE
Status: COMPLETED | OUTPATIENT
Start: 2024-05-03 | End: 2024-05-03

## 2024-05-03 RX ORDER — NICOTINE 21 MG/24HR
1 PATCH, TRANSDERMAL 24 HOURS TRANSDERMAL EVERY 24 HOURS
Qty: 28 PATCH | Refills: 0 | Status: SHIPPED | OUTPATIENT
Start: 2024-05-03

## 2024-05-03 RX ORDER — GUAIFENESIN/DEXTROMETHORPHAN 100-10MG/5
10 SYRUP ORAL EVERY 4 HOURS PRN
COMMUNITY

## 2024-05-03 RX ORDER — AMOXICILLIN 250 MG
2 CAPSULE ORAL DAILY PRN
COMMUNITY

## 2024-05-03 RX ORDER — LOPERAMIDE HYDROCHLORIDE 2 MG/1
2 TABLET ORAL 4 TIMES DAILY PRN
Qty: 10 TABLET | Refills: 0 | Status: SHIPPED | OUTPATIENT
Start: 2024-05-03

## 2024-05-03 RX ORDER — BUPRENORPHINE AND NALOXONE 8; 2 MG/1; MG/1
1 FILM, SOLUBLE BUCCAL; SUBLINGUAL 2 TIMES DAILY
Qty: 10 FILM | Refills: 0 | Status: SHIPPED | OUTPATIENT
Start: 2024-05-03

## 2024-05-03 RX ORDER — MAGNESIUM HYDROXIDE/ALUMINUM HYDROXICE/SIMETHICONE 120; 1200; 1200 MG/30ML; MG/30ML; MG/30ML
30 SUSPENSION ORAL EVERY 6 HOURS PRN
COMMUNITY

## 2024-05-03 RX ORDER — GABAPENTIN 300 MG/1
300 CAPSULE ORAL 3 TIMES DAILY PRN
Qty: 15 CAPSULE | Refills: 0 | Status: SHIPPED | OUTPATIENT
Start: 2024-05-03

## 2024-05-03 RX ORDER — SERTRALINE HYDROCHLORIDE 25 MG/1
25 TABLET, FILM COATED ORAL DAILY
Qty: 7 TABLET | Refills: 0 | Status: SHIPPED | OUTPATIENT
Start: 2024-05-03

## 2024-05-03 RX ORDER — ONDANSETRON 4 MG/1
4 TABLET, ORALLY DISINTEGRATING ORAL EVERY 8 HOURS PRN
Qty: 12 TABLET | Refills: 0 | Status: SHIPPED | OUTPATIENT
Start: 2024-05-03

## 2024-05-03 RX ORDER — LORATADINE 10 MG/1
10 TABLET ORAL DAILY PRN
COMMUNITY

## 2024-05-03 RX ORDER — IBUPROFEN 200 MG
600 TABLET ORAL EVERY 6 HOURS PRN
COMMUNITY

## 2024-05-03 RX ORDER — CLONIDINE HYDROCHLORIDE 0.1 MG/1
0.1 TABLET ORAL ONCE
Status: COMPLETED | OUTPATIENT
Start: 2024-05-03 | End: 2024-05-03

## 2024-05-03 RX ORDER — TRAZODONE HYDROCHLORIDE 50 MG/1
50 TABLET, FILM COATED ORAL
Qty: 10 TABLET | Refills: 0 | Status: SHIPPED | OUTPATIENT
Start: 2024-05-03

## 2024-05-03 RX ADMIN — GABAPENTIN 300 MG: 300 CAPSULE ORAL at 14:27

## 2024-05-03 RX ADMIN — CLONIDINE HYDROCHLORIDE 0.1 MG: 0.1 TABLET ORAL at 14:28

## 2024-05-03 RX ADMIN — DOXYCYCLINE HYCLATE 100 MG: 100 CAPSULE ORAL at 08:26

## 2024-05-03 RX ADMIN — NICOTINE POLACRILEX 4 MG: 4 GUM, CHEWING BUCCAL at 10:21

## 2024-05-03 ASSESSMENT — ACTIVITIES OF DAILY LIVING (ADL)
ADLS_ACUITY_SCORE: 35
ADLS_ACUITY_SCORE: 37
ADLS_ACUITY_SCORE: 37
ADLS_ACUITY_SCORE: 35
ADLS_ACUITY_SCORE: 35
ADLS_ACUITY_SCORE: 37
ADLS_ACUITY_SCORE: 37
ADLS_ACUITY_SCORE: 35
ADLS_ACUITY_SCORE: 37

## 2024-05-03 ASSESSMENT — COLUMBIA-SUICIDE SEVERITY RATING SCALE - C-SSRS
5. HAVE YOU STARTED TO WORK OUT OR WORKED OUT THE DETAILS OF HOW TO KILL YOURSELF? DO YOU INTEND TO CARRY OUT THIS PLAN?: NO
1. IN THE PAST MONTH, HAVE YOU WISHED YOU WERE DEAD OR WISHED YOU COULD GO TO SLEEP AND NOT WAKE UP?: YES
2. HAVE YOU ACTUALLY HAD ANY THOUGHTS OF KILLING YOURSELF IN THE PAST MONTH?: YES
4. HAVE YOU HAD THESE THOUGHTS AND HAD SOME INTENTION OF ACTING ON THEM?: NO
1. IN THE PAST MONTH, HAVE YOU WISHED YOU WERE DEAD OR WISHED YOU COULD GO TO SLEEP AND NOT WAKE UP?: YES
2. HAVE YOU ACTUALLY HAD ANY THOUGHTS OF KILLING YOURSELF LIFETIME?: YES
REASONS FOR IDEATION LIFETIME: MOSTLY TO END OR STOP THE PAIN (YOU COULDN'T GO ON LIVING WITH THE PAIN OR HOW YOU WERE FEELING)
3. HAVE YOU BEEN THINKING ABOUT HOW YOU MIGHT KILL YOURSELF?: YES
6. HAVE YOU EVER DONE ANYTHING, STARTED TO DO ANYTHING, OR PREPARED TO DO ANYTHING TO END YOUR LIFE?: YES

## 2024-05-03 ASSESSMENT — PATIENT HEALTH QUESTIONNAIRE - PHQ9: SUM OF ALL RESPONSES TO PHQ QUESTIONS 1-9: 22

## 2024-05-03 NOTE — CONSULTS
Met with patient to discuss possible treatment options and to possibly complete an assessment. Assessment has been completed at this time and patient is being recommended:    Recommendations: Residential Treatment at [facility], [facility] and [facility] and [facility].      Referrals/ Alternatives:  [facility]  [address] [place] [address]  Phone: [phone]  [email or web address]     [place] Access Team for Referrals  Phone: [phone]  Fax: [phone]  [email or web address]     [facility]  Main: [phone]  Referral/Intake Line: [phone]  Fax: [phone]  [email or web address]     [facility] (+ages 0-11)  [address] [place] [address]  Phone: [phone]  Fax: [phone]  [email or web address]    Referrals  are being made at this time.  Patient is aware of the referrals and is in agreement.     ROBERTO South on 5/3/2024 at 10:34 AM

## 2024-05-03 NOTE — ED PROVIDER NOTES
Patient was signed out to me at change of shift by Dr. Moore, please see his note for full details.  Briefly, patient presented to the emergency department with altered mental status suspect secondary to substances.  She was evaluated (or attempted to be evaluated) by this CD  in order to try to get into outpatient treatment directly from the emergency department, however it was too sleepy to cooperate with an intake.  Plan was to monitor mental status and have the CD  see the patient once her mental status improved.    By the time her mental status improved, it was after hours and CD  was not available.  Plan will be to keep her here per report at signout, until tomorrow when CD  can see her.    Of note, she also was seen for some hand pain and swelling and was felt to have an infection.  She has been given doxycycline here in the ED and Dr. Moore did also order a prescription for outpatient doxycycline.     Shavon Arroyo MD  05/03/24 0003

## 2024-05-03 NOTE — PROGRESS NOTES
"[facility] Assessment      Vital signs:     Ht 1.524 m (5')   Wt 77.1 kg (170 lb)   BMI 33.20 kg/m        Pt came from Emergency Dept    Counselor: Catherine   Drug of Choice: Meth, Fentanyl, Percocet, hard cocaine \"hard\",xanax   Last use: Meth/Fentanyl - 5/1/24.   Perc's 4/30, Hard Cocaine 4/29, Xanax - 5 days ago    Pt has had 5 years of sobriety    Home clinic/MD: Pt does not currently have    Psychiatrist/therapist: no    Medical history/current conditions:  none    H&P Screen:  H&P within the last 90 days: Yes.  Date: 5/3/24 Location: Emergency Dept / [facility] diagnosis: depression and anxiety  Medication compliant?: no meds  Recent sucidal thoughts? Approx 3 days ago.  Pt thought she is better off dead.  No plan. Loved one encouraged pt to come to tx When? 5/1/24  Current thought of self-harm? none  Plan? no    Pain assessment:   Pt. Experiencing pain at this time?  Yes.  Rating on 0-10 scale: (1-10 scale): 6.  Location: lower back pain   Chronic  Result of: Epidural 4 yrs ago.     LP Falls assessment    Has patient had a fall(s) in the last 6 months?  No  If yes, what were the circumstances surrounding the fall(s)? NA  Does patient have gait dysfunctions? (limping, dragging of toes, shuffling feet, unsteadiness, difficulty standing /walking)  No  Does patient appear to have deconditioning/muscle loss/malnutrition/fatigue? (due to inactivity, bedrest (long hospitalization), medical condition(s) No  Does patient experience confusion, dizziness or vertigo? No  Is patient visually impaired? No   Does patient have any adaptive equipment (hearing aids, wheelchair, walker, prosthesis, crutches, cane, etc..) No  Is patient taking 2 or more of the following medications?  anticonvulsants, anti-hypertensives, diuretics, laxatives, sedatives, and psychotropics (single-select) No  Is patient taking medication (s) that would cause urinary or bowel urgency (ex: " lactulose/Furosemide)? No  Does patient have a medical condition (ex: Diabetes, Liver Disease, Respiratory Diseases, Chronic Pain, Heart Disease, Neuropathy, Seizure like Disorder, etc...) that could affect balance/gait or risk for falls? No    If yes to any of the above educate patient on fall prevention while at [facility].           Floors clutter/obstacle free           Proper footwear/[place] shoes          Encourage the use of appropriate lighting.            Adjust walking speed accordingly          Recommend caution going on longer walks. Try shorter walks and see how you do first.  Use elevators when appropriate.          Notify staff if you are experiencing fatigue, weakness, drowsiness/ dizziness or have had a fall.           Use adaptive equipment as recommended          Wheelchairs must be managed and propelled independently without staff assistance           Expectation of complete independence with all cares and compliance.  Report to staff if having difficulty     LP patient who poses a potential falls risk will be advised of the following:  Please follow the recommendations as listed above or it may affect your treatment plan.  Your treatment team would have to evaluate if this level of care is appropriate for you.    Patient acknowledges and verbalizes understanding of the above criteria? Yes  Support staff / counselors notified of pt Fall Screen and plan of prevention. LPRN to re-assess as appropriate. White board and SBAR updated  no risk  ____________________________________________________________________________________________________________________________  RN Assessment of Infectious Disease concerns:    Infectious disease concerns None    _____________________________________________________________________________________________________________________________    LP Community Medical Screen for COVID-19    Do you have any of the following NEW or worsening symptoms NOT attributed to  pre-existing conditions?    No    Fever of 100.0  F (37.8 C) or over  Chills  Cough  Shortness of Breath  Loss of taste or smell  Generalized body aches  Persistent headache  Sore throat (or trouble eating or drinking in young children?)  Nausea, Vomiting, or diarrhea (loose stools)    If pt responds YES to any of the symptoms / Positive COVID-19  without symptoms pt will need to leave unit immediately and can return in 6 days from discharge date.      Did you test positive for COVID-19 in the last 10 days or are you waiting on the test results due to an exposure or symptoms?  No    Has anyone told you to self-quarantine due to exposure to someone with COVID-19?  No    COVID - 9 positive patients must quarantine for five days.  They can return to in-person therapy on day 6 following Duration of Special Precautions for COVID-19.      COVID-19 Test completed by LPRN ? No    COVID-19 - Pt informed of the following while at LP:    1) If pt has any of the symptoms below, notify staff immediately.    Fever   Cough   Shortness of breath or difficulty breathing   Chills   Repeated shaking with chills  Muscle pain   ____________________________________________________________________________________________________________________________    RN Assessment of Patient's Ability to Safely Manage and Self-Administer Respiratory Treatments    Has experience in the management of Respiratory (If NA, indicate and move to Integrative Therapies):  NA    Integrative Therapies: Essential Oils    Patient requesting essential oil inhaler to manage (Mood/Mental Health/Physical/Spiritual symptoms).     Discussed appropriate use of essential oil inhalers and instructed patient not to leave labeled product out on unit.     Patient was screened for kidney disease, asthma/reactive airway disease and rashes and wounds or 1st trimester of pregnancy    List Essential Oils requested by pt DECRAVE LIMIT ONE ESSENTIAL OIL PER PT    Patient verbalized  "and demonstrated understanding of how to use essential oil inhaler correctly and will notify LP RN with any concerns or side effects. Patient agrees not to share their essential oil inhaler with other clients.  Continue to support the patient in safely utilizing integrative therapies as able to manage symptoms during treatment.     Patient tobacco use:    Do you use tobacco? yes   Type? tobacco  NRT (Nicotine Replacement therapy) ordered? Not at this time   Pt is aware of the dangers of tobacco cessation and in contemplation.    Pt given written education.    Nutritional Assessment:    Have you ever purged, binged or restricted yourself as a way to control your weight?   Yes, explain: when I was young. Pt would purge. Pt stated she used to weigh 300lbs, which is why she does meth     Are you on a special diet?   No     Do you have any concerns regarding your nutritional status?   No     Have you had any appetite changes in the last 3 months?   Yes, explain: using meth to reduce food cravings   Have you had weight loss or weight gain of more than 10 lbs in the last 3 months?   If patient gained or lost more than 10 lbs, then refer to program RN / attending Physician for assessment.   No   Was the patient informed of BMI?    Above,  General nutrition education   No, due to pt hx of eating disorder   Have you engaged in any risk-taking behavior that would put you at risk for exposure to blood-borne or sexually transmitted diseases?   Yes, explain: Request for testing sent to Dr Cash   Do you have any dental problems?   No       Review with pt's for opioid use disorder: (Please delete below if not applicable)    Pt reporting last use of opioid on date   Drug of Choice: Meth, Fentanyl, Percocet, hard cocaine \"hard\",xanax   Last use: Meth/Fentanyl - 5/1/24.   Perc's 4/30, Hard Cocaine 4/29, Xanax - 5 days ago   Did pt's bring comfort medications and/or Suboxone as instructed to manage withdrawal? no   Who is pt's " community provider that helps with opioid use disorder? none   Refer pt to walk-in Recovery Clinic? Yes  LPRN to review with pt:   Pt is expected to attend all required LP programming without staff prompting   Compliancy with all prescribed medication self-administration is required   Pt understands LP drug toxicology screening process yes    LPRN reviewed with pt the following information:  Yes  Pt informed if leaving AMA, they will be directed to take medications with them.  Should pt's choose to leave medications at LP, ALL medications will be packaged and delivered to inpatient pharmacy for temporary storage.  Inpt pharmacy will follow protocol to reach out to pt.  If pharmacy unable to reach pt and/or pt does not retrieve medications, they will be destroyed per inpt pharmacy protocol.   In regards to 'medical concerns/medication refill expectations' while at LP:  Pt understands LP has no rounding/managing provider to assess medical issues or to refill medications.  Pt's instructed to make virtual/phone appt/s with community provider/s and notify LPRN of date and time  Pt's understand they may not leave LP to attend any medical appt's.   Pt's understand they are responsible for having a plan to refill medications and to allow time to troubleshoot.      Pt verbalizes understanding of the above criteria yes    Nursing Assessment Summary:  As above  Prescribed Doxycycline for right hand cellulitis (due to IV use)  Pt endorses back pain. Will use OTC and speak with Recovery clinic provider for med support       On-going nursing intervention required?   No    Acute care visit recommended: yes.   has a history of IV drug use and is therefore at risk for HIV and Hepatitis C., requests testing for or is symptomatic of sexually transmitted disease.  , and Routed request for testing to Dr Shreya GUAJARDO [facility]  Nurse Liaison / CD [facility]  O: [phone]  Fax:[phone]  LPRN [place]  [id number]  M-F: 7AM to 5:30PM   Sat-Sun: 7AM to 3PM  After hours: [phone]

## 2024-05-03 NOTE — PROGRESS NOTES
The patient scored elevated on the PHQ-9 question 9 so she completed the [place] assessment and scored moderate risk and was given the [phone] number to call if she is feeling to be in crisis situation.

## 2024-05-03 NOTE — PROGRESS NOTES
Name: Myra Tian  Date: 5/3/2024  Medical Record: [id number]    Envelope Number: [id number]    List of Contents (List each item separately in new row):   Cell Phone w/case      Admission:  I am responsible for any personal items that are not sent to the safe or pharmacy.  [facility] is not responsible for loss, theft or damage of any property in my possession.      Patient Signature:  ___________________________________________       Date/Time:__________________________    Staff Signature: __________________________________       Date/Time:__________________________    Discharge:  [facility] has returned all of my personal belongings:    Patient Signature: ________________________________________     Date/Time: ____________________________________    Staff Signature: ______________________________________     Date/Time:_____________________________________

## 2024-05-03 NOTE — Clinical Note
Patient received gabapentin and clonidine at 2:30pm FYI.  OK to start Suboxone today - can take 1 film as soon as it arrives.  If needed, she can take second dose this evening.  Tomorrow plan for Suboxone 8-2mg BID.  Thanks!

## 2024-05-03 NOTE — MEDICATION SCRIBE - ADMISSION MEDICATION HISTORY
Medication Scribe Admission Medication History    Admission medication history is complete. The information provided in this note is only as accurate as the sources available at the time of the update.    Information Source(s): Patient, Hospital records, and CareEverywhere/SureScripts via in-person    Pertinent Information: Patient reports Levonorgestrel IUD was placed 4 years ago.    Changes made to PTA medication list:  Added: Levonorgestrel 52 mg IUD.  Deleted: None  Changed: None    Allergies reviewed with patient and updates made in EHR: yes    Medication History Completed By: Donald Gunter MD 5/3/2024 9:31 AM    PTA Med List   Medication Sig Last Dose    doxycycline hyclate (VIBRAMYCIN) 100 MG capsule Take 1 capsule (100 mg) by mouth 2 times daily for 14 days     levonorgestrel (MIRENA, 52 MG,) 52 MG (20 mcg/day) IUD 1 each by Intrauterine route once More than a month at 4 years ago

## 2024-05-03 NOTE — TELEPHONE ENCOUNTER
"[facility]    Review with pt's for opioid use disorder: (Please delete below if not applicable)    Pt reporting last use of opioid on date   Drug of Choice: Meth, Fentanyl, Percocet, hard cocaine \"hard\",xanax   Last use: Meth/Fentanyl - 5/1/24.   Perc's 4/30, Hard Cocaine 4/29, Xanax - 5 days ago   Did pt's bring comfort medications and/or Suboxone as instructed to manage withdrawal? no   Who is pt's community provider that helps with opioid use disorder? none   Refer pt to walk-in Recovery Clinic? Yes  LPRN to review with pt:   Pt is expected to attend all required LP programming without staff prompting   Compliancy with all prescribed medication self-administration is required   Pt understands LP drug toxicology screening process yes      [facility] pt has history of IV drug use and is therefore at risk for HIV & Hepatitis C  AND/OR, high risk sexual activity and requests testing for or is symptomatic of sexually transmitted disease.    Requesting testing.     [facility]  Nurse Liaison / CD [facility] ([facility])  [address]  O:[phone]  F:[phone]  RN [place] [id number]  LP After hours(UC):[phone]  LP admin counselor:[phone]  CD assess:[phone]    "

## 2024-05-03 NOTE — NURSING NOTE
Patient is exhibiting opioid withdrawal symptoms. Provider has ordered the following clinic administered medication (s) (CAM) to to be given:    BP:122/80  P: 81    Administrations This Visit       cloNIDine (CATAPRES) tablet 0.1 mg       Admin Date  05/03/2024 Action  $Given Dose  0.1 mg Route  Oral Site   Documented By  Alla Phipps RN    NDC: 41502-579-77    Lot#: [id number]    : [place]    Patient Supplied?: No              gabapentin (NEURONTIN) capsule 300 mg       Admin Date  05/03/2024 Action  $Given Dose  300 mg Route  Oral Site   Documented By  Alla Phipps RN    NDC: 32550-816-02    Lot#: [id number]    : [facility]    Patient Supplied?: No                  Medication(s) were given to the patient by RN utilizing the rights of medication administration (patient, drug, dose, time, route, and documentation).     Alla Phipps RN on 5/3/2024 at 2:30 PM

## 2024-05-03 NOTE — PROGRESS NOTES
Pt completed RN portion of admission and then went to recovery clinic and was seen there. Pt was supposed to come back up right after her appt. But she has not been seen by staff since going to the recovery clinic. We have searched the entire unit alerted security and looked outside the building. Pt's belongings were secured and packed.

## 2024-05-03 NOTE — ED PROVIDER NOTES
"ED Observation Discharge Summary  [facility]  Discharge Date: 5/3/2024    Myra Tian MRN: [id number]   Age: 23 year old YOB: 2000     Brief HPI & Initial ED Course     Chief Complaint   Patient presents with    Drug / Alcohol Assessment     Fentanyl, smoke and IV meth and percs, last use PTA, R hand swelling due to IV injection with \"speed ball\"    Suicidal     Thoughts only     HPI  Myra Tian is a 23 year old female with PMH notable for IV drug use who presented to the ED with altered mental status. Initial history was limited due to altered mental status. The patient arrived with altered mental status with reason to suspect alcohol or other drug intoxication as etiology, and an exam that was without findings suggestive of trauma.  She also presented with concerns for cellulitis of the right hand.  She was evaluated by orthopedics who recommended antibiotics only with no additional intervention.  Patient was given doxycycline while in the ED observation unit.  Initially she was altered presumably from alcohol or other substance and was able to clear and contract for safety.  She met with social work chemical dependency who recommended [facility] which patient was agreeable to.  Prior to discharge she had prescription for doxycycline sent to the [facility] outpatient pharmacy.    Upon being evaluated in the emergency department, the patient was found to have a condition that would benefit from ongoing monitoring. Observation care was initiated with plan for close clinical monitoring of the patient and her mental status for clearing of intoxicating substance, and broadening of the work-up if not clearing appropriately or if other indications develop.     See ED Observation H&P for further details on the patient's presenting history and initial evaluation.     Physical Exam   BP: 107/69  Pulse: 89  Temp: 97.4  F (36.3  C)  Resp: 16  Height: 152.4 cm (5')  Weight: " 77.1 kg (170 lb)  SpO2: 100 %    Physical Exam  General: no acute distress. Alert.   HENT: MMM. Atraumatic head.   Eyes: PERRL, normal sclerae   Neck: non-tender, supple  Resp: Normal work of breathing, normal respiratory rate  Neuro: alert, fully oriented.   Integumentary/Skin: redness and swelling over right dorsal hand. No fluctuance.    Results      Procedures                  Labs Ordered and Resulted from Time of ED Arrival to Time of ED Departure   ERYTHROCYTE SEDIMENTATION RATE AUTO - Abnormal       Result Value    Erythrocyte Sedimentation Rate 30 (*)    CRP INFLAMMATION - Abnormal    CRP Inflammation 15.32 (*)    CBC WITH PLATELETS AND DIFFERENTIAL - Abnormal    WBC Count 9.2      RBC Count 4.03      Hemoglobin 11.6 (*)     Hematocrit 36.2      MCV 90      MCH 28.8      MCHC 32.0      RDW 12.2      Platelet Count 276      % Neutrophils 57      % Lymphocytes 34      % Monocytes 6      % Eosinophils 2      % Basophils 1      % Immature Granulocytes 0      NRBCs per 100 WBC 0      Absolute Neutrophils 5.4      Absolute Lymphocytes 3.1      Absolute Monocytes 0.5      Absolute Eosinophils 0.1      Absolute Basophils 0.1      Absolute Immature Granulocytes 0.0      Absolute NRBCs 0.0     COMPREHENSIVE METABOLIC PANEL - Normal    Sodium 137      Potassium 3.9      Carbon Dioxide (CO2) 29      Anion Gap 8      Urea Nitrogen 11.0      Creatinine 0.75      GFR Estimate >90      Calcium 9.1      Chloride 100      Glucose 99      Alkaline Phosphatase 79      AST 26      ALT 25      Protein Total 7.2      Albumin 4.2      Bilirubin Total 0.3     HCG QUALITATIVE PREGNANCY - Normal    hCG Serum Qualitative Negative              Observation Course   The patient was admitted to observation status with plan for close clinical monitoring of the patient and her mental status for clearing of intoxicating substance, and broadening of the work-up if not clearing appropriately or if other indications develop.     Serial  assessments of the patient's mental status were performed. Nursing notes were reviewed. During the observation period, the patient did not require medications for agitation, and did not require restraints/seclusion for patient and/or provider safety.      Patient was started on abx. No abscess was seen on US. Follow up with ortho ordered. Patient has abx sent to [facility] discharge pharmacy by previous provider. Patient taken to [facility] and they will  the abx from pharmacy. Patient advised to elevate RUE, comply with abx, and return to ED if swelling or redness worsens.       During the patient's time in observation care, the patient's situation changed such that inpatient admission is now indicated. Patient transitioned to inpatient status with [facility].     Discharge Diagnoses:   Final diagnoses:   Cellulitis of right hand   Phlegmon   Polysubstance abuse (H)       --  Yenifer Mcwilliams PA-C  [facility] EMERGENCY DEPARTMENT  5/3/2024               Millicent Sims MD  05/03/24 [id number]

## 2024-05-03 NOTE — PROGRESS NOTES
[facility] Initial Visit    ASSESSMENT/PLAN                                                      1. Opioid use disorder, severe, dependence (H)  Reviewed history.  She meets criteria for severe OUD.  Discussed MAT options and she is open to trying Suboxone after discussing risk/benefits.  Reviewed induction instructions for Suboxone including risk of precipitated withdrawal.  She will start Suboxone 8-2mg BID today (COWS = 14), advised her she can skip second dose if not needed but that goal of Suboxone is to treat withdrawal AND cravings.  Script for Narcan sent.  Encouraged programming at  as planned.  - Drugs of Abuse Screen Urine (POC CUPS) POCT; Standing  - Drugs of Abuse Screen Urine (POC CUPS) POCT  - buprenorphine HCl-naloxone HCl (SUBOXONE) 8-2 MG per film; Place 1 Film under the tongue 2 times daily  Dispense: 10 Film; Refill: 0  - naloxone (NARCAN) 4 MG/0.1ML nasal spray; Spray 1 spray (4 mg) into one nostril alternating nostrils as needed for opioid reversal every 2-3 minutes until assistance arrives  Dispense: 0.2 mL; Refill: 11    2. Encounter for monitoring opioid maintenance therapy  - Drugs of Abuse Screen Urine (POC CUPS) POCT; Standing  - Drugs of Abuse Screen Urine (POC CUPS) POCT    3. Opioid withdrawal (H)  Patient received one dose of clonidine and gabapentin in clinic with some improvement in her withdrawal symptoms.  Reviewed use of comfort medications as ordered.    - gabapentin (NEURONTIN) capsule 300 mg  - cloNIDine (CATAPRES) tablet 0.1 mg  - cloNIDine (CATAPRES) 0.1 MG tablet; Take 1 tablet (0.1 mg) by mouth 2 times daily as needed (opioid withdrawal)  Dispense: 10 tablet; Refill: 0  - gabapentin (NEURONTIN) 300 MG capsule; Take 1 capsule (300 mg) by mouth 3 times daily as needed (opioid withdrawal)  Dispense: 15 capsule; Refill: 0  - ondansetron (ZOFRAN ODT) 4 MG ODT tab; Take 1 tablet (4 mg) by mouth every 8 hours as needed for nausea or vomiting   Dispense: 12 tablet; Refill: 0  - loperamide (IMODIUM A-D) 2 MG tablet; Take 1 tablet (2 mg) by mouth 4 times daily as needed for diarrhea  Dispense: 10 tablet; Refill: 0  - traZODone (DESYREL) 50 MG tablet; Take 1 tablet (50 mg) by mouth nightly as needed for sleep  Dispense: 10 tablet; Refill: 0    4. Severe methamphetamine use disorder (H)  Last use 2 days ago.  Discussed potential pharmacotherapy options such as bupropion if needed at follow-up visit.  Encouraged programming at  as planned.  Discussed briefly her history of bulimia and how methamphetamine use has been closely linked to her eating disorder.  We discussed the importance of addressing both aspects of the disease.      5. Tobacco use disorder  She would like to quit smoking.  Trial of nicotine patch as ordered.    - nicotine (NICODERM CQ) 21 MG/24HR 24 hr patch; Place 1 patch onto the skin every 24 hours Remove at bedtime  Dispense: 28 patch; Refill: 0    6. Anxiety and depression  Needs improvement.  PHQ9 positive for question #9, met with clinic [facility] for C-SSRS which was moderate risk.  She will continue with programming at .  Plan to start sertraline 25mg daily.  - sertraline (ZOLOFT) 25 MG tablet; Take 1 tablet (25 mg) by mouth daily  Dispense: 7 tablet; Refill: 0         Return in about 5 days (around 5/8/2024) for Follow up, in person. -- [facility].      Patient counseling completed today:  Discussed mechanism of action, potential risks/benefits/side effects of medications and other recommendations above.    Discussed risk of precipitated withdrawal with initiation of buprenorphine in the presence of full opioid agonists.    Reviewed directions for initiation of buprenorphine to reduce risk of precipitated withdrawal and maximize efficacy.    Harm reduction counseling including never use alone, availability of naloxone, avoiding combination of opioids with  benzodiazepines, alcohol, or other sedatives, safer administration.      Discussed importance of avoiding isolation, building a network of supportive relationships, avoiding people/places/things associated with past use to reduce risk of relapse; including motivational interviewing regarding psychosocial treatment for addiction.     SUBJECTIVE                                                    Rooming information:  Preferred name and pronouns: Myra    Reason for visit: see below   Last use of fentanyl or other full opioid agonist: aprox 2 days ago   Last dose of buprenorphine (if applicable:) NA  Withdrawal symptoms currently: sweats really bad, body chills   Other substances taken in the last 2 weeks: meth   LMP (if applicable:) irregular, on Birth control   Food/housing/insurance assistance needed: housing assistance will be needed after LP.   3rd party involvement desired:   Best phone number for patient: on file       CC/HPI:  Myra Tian is a 23 year old female with PMHx depression, anxiety, eating disorder (bulimia), nicotine use disorder, methamphetamine use disorder, and opioid use disorder who presents to the Recovery Clinic for initial visit.      Brief History:  Myra Tian was first seen in Recovery Clinic on 05/03/24. They were referred by [facility].   Patient's reasons for seeking treatment include boyfriend is making her go to treatment. Boyfriend is in residential.  She is considering MAT options.      Substance Use History :  Opioids:   Age at first use: 22 - started using heroin/fentanyl; Meth since 13  Current use: substance: Fentanyl and Meth; quantity 1g daily; route: inhalation (fentanyl), IV (meth) ; timing of last use: 2 days ago;      IV drug use: Yes: (4/30/24)  History of overdose: Yes:   Previous residential or outpatient treatments for addiction : No  Previous medication treatments for addiction: No  Longest period of sobriety: 5.5 years   Medical complications related to substance use:  cellulitis   Hepatitis C: negative ; Date of most recent testin months ago  HIV: negative ; Date of most recent testin months ago     DSM-5 OUD criteria met:  Taken in larger amounts/greater time spent in behavior over longer period of time than intended,Yes:    Persistent desire or unsuccessful efforts to cut down or control use/behavior, Yes:   A great deal of time is spent in activities necessary to obtain the substance/participate in the behavior or recover from its effects, Yes:    Cravings, Yes:    Recurrent use/behavior resulting in failure to fulfill major role obligations at work, school, or home, Yes:    Continued use/behavior despite having persistent or recurrent social or interpersonal problems caused or exacerbated by effects of use/behavior, Yes:    Important social, occupational, or recreational activities are given up or reduced because of use/behavior, Yes:    Continued use/behavior despite knowledge of having a persistent or recurrent physical or psychological problem that is likely to have been caused or exacerbated by use/behavior, Yes:    Tolerance, Yes:     Withdrawal, Yes: hot/cold sweats, body aches    Other Addiction History:  Stimulants (cocaine, methamphetamine, MDMA/ecstasy)   Yes - began using at age 13  Sedatives/hypnotics/anxiolytics: (benzodiazepines, GHB, Ambien, phenobarbital)  Yes - began using Xanax about 6 months ago, using about weekly, last use 5 days ago  Alcohol:   none  Nicotine: (cigarettes, vaping, chew/snuff)  Yes - smoking (3/4 ppd)   Cannabis:   Yes - would use daily when not using meth  Hallucinogens/Dissociatives: (acid, mushrooms, ketamine)  Yes - last use 2024  Eating disorder:  Yes - history of bulimia (started using meth to curb appetite - this is why she has not stopped using meth)  Gambling:   None         [place] Prescription Drug Monitoring Program Reviewed:  Yes;       PAST PSYCHIATRIC HISTORY:  Diagnoses- Depression and anxiety   Suicide  Attempts: Yes - most recent in 2023  Hospitalizations: Yes - most recent in 2023 in [place]         5/3/2024     1:00 PM   PHQ   PHQ-9 Total Score 22   Q9: Thoughts of better off dead/self-harm past 2 weeks More than half the days         If PHQ-9 score of 15 or higher, has [facility] Clinic therapist or provider been notified? Yes    Any current suicidal ideation? No  If yes, has [facility] therapist or provider been notified? Yes Positive PHQ 9     Mental health provider: none  (follow up on MH referral if needed)      Social History  Housing status:     Employment status: Unemployed, not seeking work  Relationship status:  boyfriend (in prison)  Children: 2 children  Legal: none         Medications:  Current Outpatient Medications   Medication Sig Dispense Refill    buprenorphine HCl-naloxone HCl (SUBOXONE) 8-2 MG per film Place 1 Film under the tongue 2 times daily 10 Film 0    cloNIDine (CATAPRES) 0.1 MG tablet Take 1 tablet (0.1 mg) by mouth 2 times daily as needed (opioid withdrawal) 10 tablet 0    gabapentin (NEURONTIN) 300 MG capsule Take 1 capsule (300 mg) by mouth 3 times daily as needed (opioid withdrawal) 15 capsule 0    loperamide (IMODIUM A-D) 2 MG tablet Take 1 tablet (2 mg) by mouth 4 times daily as needed for diarrhea 10 tablet 0    naloxone (NARCAN) 4 MG/0.1ML nasal spray Spray 1 spray (4 mg) into one nostril alternating nostrils as needed for opioid reversal every 2-3 minutes until assistance arrives 0.2 mL 11    nicotine (NICODERM CQ) 21 MG/24HR 24 hr patch Place 1 patch onto the skin every 24 hours Remove at bedtime 28 patch 0    ondansetron (ZOFRAN ODT) 4 MG ODT tab Take 1 tablet (4 mg) by mouth every 8 hours as needed for nausea or vomiting 12 tablet 0    sertraline (ZOLOFT) 25 MG tablet Take 1 tablet (25 mg) by mouth daily 7 tablet 0    traZODone (DESYREL) 50 MG tablet Take 1 tablet (50 mg) by mouth nightly as needed for sleep 10 tablet 0    acetaminophen (TYLENOL) 500 MG tablet Take  500-1,000 mg by mouth every 8 hours as needed for mild pain      alum & mag hydroxide-simethicone (MAALOX) 200-200-20 MG/5ML SUSP suspension Take 30 mLs by mouth every 6 hours as needed for indigestion      benzocaine-menthol (CEPACOL) 15-3.6 MG lozenge Place 1 lozenge inside cheek every 2 hours as needed for sore throat      doxycycline hyclate (VIBRAMYCIN) 100 MG capsule Take 1 capsule (100 mg) by mouth 2 times daily for 14 days 28 capsule 0    guaiFENesin-dextromethorphan (ROBITUSSIN DM) 100-10 MG/5ML syrup Take 10 mLs by mouth every 4 hours as needed for cough      ibuprofen (ADVIL/MOTRIN) 200 MG tablet Take 600 mg by mouth every 6 hours as needed for pain      levonorgestrel (MIRENA, 52 MG,) 52 MG (20 mcg/day) IUD 1 each by Intrauterine route once      loratadine (CLARITIN) 10 MG tablet Take 10 mg by mouth daily as needed for allergies      melatonin 5 MG tablet Take 5 mg by mouth nightly as needed for sleep      senna-docusate (SENOKOT-S/PERICOLACE) 8.6-50 MG tablet Take 2 tablets by mouth daily as needed for constipation       No current facility-administered medications for this visit.       No Known Allergies    No past medical history on file.    No past surgical history on file.    No family history on file.      REVIEW OF SYSTEMS:  General: Withdrawal symptoms as described below.  No recent fevers.   Eyes:  No vision concerns.  No jaundice.    Resp: No coughing, wheezing or shortness of breath  CV: No chest pains or palpitations  GI: No complaints other than as above  : No urinary frequency or dysuria, no discharge  Musculoskeletal: No significant muscle or joint pains other than as above.  No edema  Neurologic: No numbness, tingling, weakness, problems with balance or coordination  Psychiatric: No acute concerns other than as above.   Skin: No rashes or areas of acute infection    OBJECTIVE                                                        Clinical Opioid Withdrawal Scale (COWS)    Resting Pulse  Rate  1  =     Sweating    (over past 1/2 hour) 1  =  subjective report of chills or flushing   Restlessness  1  =  reports difficulty sitting still, but is able to do so   Pupil size  1  =  pupils possibly larger than normal for room light   Bone or Joint Aches    (acute only) 1  =  mild diffuse discomfort   Runny nose or tearing    (unrelated to cold/allergies) 2  =  nose running or tearing   GI Upset    (over past 1/2 hour) 0  =  no GI symptoms   Tremor    (outstretched hands) 2  =  slight tremor observable   Yawning    (during assessment) 0  =  no yawning   Anxiety/Irritability 2  =  patient obviously irritable or anxious   Gooseflesh skin 3  =  piloerection or skin can be felt or hairs standing up on arms     TOTAL SCORE  Add column for score   14       /74   Pulse 85     Physical Exam  Vitals and nursing note reviewed.   Constitutional:       General: She is not in acute distress.     Appearance: Normal appearance. She is diaphoretic. She is not ill-appearing.   HENT:      Nose: Rhinorrhea present.      Mouth/Throat:      Mouth: Mucous membranes are moist.   Eyes:      General: No scleral icterus.  Cardiovascular:      Rate and Rhythm: Normal rate.   Pulmonary:      Effort: Pulmonary effort is normal. No respiratory distress.   Skin:     Coloration: Skin is not jaundiced or pale.      Comments: piloerection   Neurological:      General: No focal deficit present.      Mental Status: She is alert and oriented to person, place, and time.      Motor: Tremor present.   Psychiatric:         Mood and Affect: Mood is anxious and depressed. Affect is flat.         Speech: Speech normal.         Behavior: Behavior is withdrawn (poor eye contact, guarded).         Thought Content: Thought content includes suicidal (see [facility] note) ideation. Thought content does not include suicidal plan.      Comments: Judgment/insight fair         Labs:    UDS:   Lab Results   Component Value Date    BUP Negative 05/03/2024     BZO Negative 05/03/2024    BAR Negative 05/03/2024    NATHALIE Negative 05/03/2024    MAMP Screen Positive (A) 05/03/2024    AMP Screen Positive (A) 05/03/2024    MDMA Negative 05/03/2024    MTD Negative 05/03/2024    [id number] Negative 05/03/2024    OXY Negative 05/03/2024    PCP Negative 05/03/2024    THC Negative 05/03/2024    TEMP 90 F 05/03/2024    SGPOCT 1.005 05/03/2024       *POC urine drug screen does not screen for Fentanyl    Recent Results (from the past 720 hour(s))   Comprehensive metabolic panel    Collection Time: 05/02/24 10:42 AM   Result Value Ref Range    Sodium 137 135 - 145 mmol/L    Potassium 3.9 3.4 - 5.3 mmol/L    Carbon Dioxide (CO2) 29 22 - 29 mmol/L    Anion Gap 8 7 - 15 mmol/L    Urea Nitrogen 11.0 6.0 - 20.0 mg/dL    Creatinine 0.75 0.51 - 0.95 mg/dL    GFR Estimate >90 >60 mL/min/1.73m2    Calcium 9.1 8.6 - 10.0 mg/dL    Chloride 100 98 - 107 mmol/L    Glucose 99 70 - 99 mg/dL    Alkaline Phosphatase 79 40 - 150 U/L    AST 26 0 - 45 U/L    ALT 25 0 - 50 U/L    Protein Total 7.2 6.4 - 8.3 g/dL    Albumin 4.2 3.5 - 5.2 g/dL    Bilirubin Total 0.3 <=1.2 mg/dL   HCG qualitative pregnancy (blood)    Collection Time: 05/02/24 10:42 AM   Result Value Ref Range    hCG Serum Qualitative Negative Negative   Erythrocyte sedimentation rate auto    Collection Time: 05/02/24 10:42 AM   Result Value Ref Range    Erythrocyte Sedimentation Rate 30 (H) 0 - 20 mm/hr   CRP inflammation    Collection Time: 05/02/24 10:42 AM   Result Value Ref Range    CRP Inflammation 15.32 (H) <5.00 mg/L   CBC with platelets and differential    Collection Time: 05/02/24 10:42 AM   Result Value Ref Range    WBC Count 9.2 4.0 - 11.0 10e3/uL    RBC Count 4.03 3.80 - 5.20 10e6/uL    Hemoglobin 11.6 (L) 11.7 - 15.7 g/dL    Hematocrit 36.2 35.0 - 47.0 %    MCV 90 78 - 100 fL    MCH 28.8 26.5 - 33.0 pg    MCHC 32.0 31.5 - 36.5 g/dL    RDW 12.2 10.0 - 15.0 %    Platelet Count 276 150 - 450 10e3/uL    % Neutrophils 57 %    %  Lymphocytes 34 %    % Monocytes 6 %    % Eosinophils 2 %    % Basophils 1 %    % Immature Granulocytes 0 %    NRBCs per 100 WBC 0 <1 /100    Absolute Neutrophils 5.4 1.6 - 8.3 10e3/uL    Absolute Lymphocytes 3.1 0.8 - 5.3 10e3/uL    Absolute Monocytes 0.5 0.0 - 1.3 10e3/uL    Absolute Eosinophils 0.1 0.0 - 0.7 10e3/uL    Absolute Basophils 0.1 0.0 - 0.2 10e3/uL    Absolute Immature Granulocytes 0.0 <=0.4 10e3/uL    Absolute NRBCs 0.0 10e3/uL   Drugs of Abuse Screen Urine (POC CUPS) POCT    Collection Time: 05/03/24  1:54 PM   Result Value Ref Range    POCT Kit Lot Number [id number]     POCT Kit Expiration Date 1/18/26     Temperature Urine POCT 90 F 90 F, 92 F, 94 F, 96 F, 98 F, 100 F    Specific [place] POCT 1.005 1.005, 1.015, 1.025    pH Qual Urine POCT 9 pH 4 pH, 5 pH, 7 pH, 9 pH    Creatinine Qual Urine POCT 20 mg/dL 20 mg/dL, 50 mg/dL, 100 mg/dL, 200 mg/dL    Internal QC Qual Urine POCT Valid Valid    Amphetamine Qual Urine POCT Screen Positive (A) Negative    Barbiturate Qual Urine POCT Negative Negative    Buprenorphine Qual Urine POCT Negative Negative    Benzodiazepine Qual Urine POCT Negative Negative    Cocaine Qual Urine POCT Negative Negative    Methamphetamine Qual Urine POCT Screen Positive (A) Negative    MDMA Qual Urine POCT Negative Negative    Methadone Qual Urine POCT Negative Negative    Opiate Qual Urine POCT Negative Negative    Oxycodone Qual Urine POCT Negative Negative    Phencyclidine Qual Urine POCT Negative Negative    THC Qual Urine POCT Negative Negative           DO CASANDRA [facility]  [address], [address]  [place] [address] [phone]
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
## 2024-05-06 ENCOUNTER — TELEPHONE (OUTPATIENT)
Dept: ORTHOPEDICS | Facility: CLINIC | Age: 24
End: 2024-05-06
Payer: MEDICAID

## 2024-05-06 PROBLEM — F19.20 CHEMICAL DEPENDENCY (H): Status: ACTIVE | Noted: 2024-05-06

## 2024-05-06 NOTE — TELEPHONE ENCOUNTER
Patient confirmed scheduled appointment:  Date: 5/13  Time: 10:40  Visit type: New hand  Provider: esther Delong to schedule per Мария

## 2024-05-10 NOTE — TELEPHONE ENCOUNTER
DIAGNOSIS:   Cellulitis of right hand [L03.113]  Phlegmon [L02.91]   APPOINTMENT DATE: 05/14/2024   NOTES STATUS DETAILS   DISCHARGE REPORT from the ER Internal 5/2/2024 - 5/3/2024  [facility]    10/26/2023  [facility]     PHOTOGRAPH MEDIA TAB    (IMAGES & REPORTS) Internal XRAY/ULTRASOUND

## 2024-05-14 ENCOUNTER — PRE VISIT (OUTPATIENT)
Dept: ORTHOPEDICS | Facility: CLINIC | Age: 24
End: 2024-05-14